# Patient Record
Sex: FEMALE | Race: WHITE | Employment: OTHER | URBAN - METROPOLITAN AREA
[De-identification: names, ages, dates, MRNs, and addresses within clinical notes are randomized per-mention and may not be internally consistent; named-entity substitution may affect disease eponyms.]

---

## 2017-03-07 ENCOUNTER — TELEPHONE (OUTPATIENT)
Dept: ONCOLOGY | Age: 82
End: 2017-03-07

## 2017-03-07 NOTE — TELEPHONE ENCOUNTER
Called pt and left a voicemail to remind her to have her labs drawn a few days prior to her appointment with Dr. Edwina Hawthorne on 3/14.

## 2017-03-08 ENCOUNTER — TELEPHONE (OUTPATIENT)
Dept: ONCOLOGY | Age: 82
End: 2017-03-08

## 2017-03-08 NOTE — TELEPHONE ENCOUNTER
Patient called stating she would like to cancel her upcoming appointment with Dr. Carmelita Trevizo and does not wish to reschedule.

## 2017-07-08 ENCOUNTER — APPOINTMENT (OUTPATIENT)
Dept: ULTRASOUND IMAGING | Age: 82
DRG: 641 | End: 2017-07-08
Attending: INTERNAL MEDICINE
Payer: MEDICARE

## 2017-07-08 ENCOUNTER — HOSPITAL ENCOUNTER (INPATIENT)
Age: 82
LOS: 1 days | Discharge: HOME | DRG: 641 | End: 2017-07-09
Attending: EMERGENCY MEDICINE | Admitting: INTERNAL MEDICINE
Payer: MEDICARE

## 2017-07-08 DIAGNOSIS — E87.5 HYPERKALEMIA: Primary | ICD-10-CM

## 2017-07-08 PROBLEM — E11.9 DM TYPE 2 (DIABETES MELLITUS, TYPE 2) (HCC): Chronic | Status: ACTIVE | Noted: 2017-07-08

## 2017-07-08 PROBLEM — N17.9 AKI (ACUTE KIDNEY INJURY) (HCC): Status: ACTIVE | Noted: 2017-07-08

## 2017-07-08 PROBLEM — M79.10 MYALGIA: Chronic | Status: ACTIVE | Noted: 2017-07-08

## 2017-07-08 PROBLEM — M79.604 RIGHT LEG PAIN: Status: ACTIVE | Noted: 2017-07-08

## 2017-07-08 PROBLEM — I25.10 CAD (CORONARY ARTERY DISEASE): Chronic | Status: ACTIVE | Noted: 2017-07-08

## 2017-07-08 LAB
ANION GAP BLD CALC-SCNC: 7 MMOL/L (ref 5–15)
ANION GAP BLD CALC-SCNC: 7 MMOL/L (ref 5–15)
APPEARANCE UR: CLEAR
BILIRUB UR QL: NEGATIVE
BUN SERPL-MCNC: 35 MG/DL (ref 6–20)
BUN SERPL-MCNC: 40 MG/DL (ref 6–20)
BUN/CREAT SERPL: 24 (ref 12–20)
BUN/CREAT SERPL: 24 (ref 12–20)
CALCIUM SERPL-MCNC: 8.5 MG/DL (ref 8.5–10.1)
CALCIUM SERPL-MCNC: 8.5 MG/DL (ref 8.5–10.1)
CHLORIDE SERPL-SCNC: 102 MMOL/L (ref 97–108)
CHLORIDE SERPL-SCNC: 106 MMOL/L (ref 97–108)
CO2 SERPL-SCNC: 26 MMOL/L (ref 21–32)
CO2 SERPL-SCNC: 26 MMOL/L (ref 21–32)
COLOR UR: NORMAL
CREAT SERPL-MCNC: 1.48 MG/DL (ref 0.55–1.02)
CREAT SERPL-MCNC: 1.64 MG/DL (ref 0.55–1.02)
CREAT UR-MCNC: 24.05 MG/DL
EOSINOPHIL #/AREA URNS HPF: NEGATIVE /[HPF]
GLUCOSE BLD STRIP.AUTO-MCNC: 117 MG/DL (ref 65–100)
GLUCOSE SERPL-MCNC: 88 MG/DL (ref 65–100)
GLUCOSE SERPL-MCNC: 99 MG/DL (ref 65–100)
GLUCOSE UR STRIP.AUTO-MCNC: NEGATIVE MG/DL
HGB UR QL STRIP: NEGATIVE
KETONES UR QL STRIP.AUTO: NEGATIVE MG/DL
LEUKOCYTE ESTERASE UR QL STRIP.AUTO: NEGATIVE
MAGNESIUM SERPL-MCNC: 2.3 MG/DL (ref 1.6–2.4)
NITRITE UR QL STRIP.AUTO: NEGATIVE
PH UR STRIP: 6 [PH] (ref 5–8)
PHOSPHATE SERPL-MCNC: 4.5 MG/DL (ref 2.6–4.7)
POTASSIUM SERPL-SCNC: 5.3 MMOL/L (ref 3.5–5.1)
POTASSIUM SERPL-SCNC: 6.4 MMOL/L (ref 3.5–5.1)
PROT UR STRIP-MCNC: NEGATIVE MG/DL
SERVICE CMNT-IMP: ABNORMAL
SODIUM SERPL-SCNC: 135 MMOL/L (ref 136–145)
SODIUM SERPL-SCNC: 139 MMOL/L (ref 136–145)
SODIUM UR-SCNC: 88 MMOL/L
SP GR UR REFRACTOMETRY: 1.01 (ref 1–1.03)
TROPONIN I SERPL-MCNC: <0.04 NG/ML
UROBILINOGEN UR QL STRIP.AUTO: 0.2 EU/DL (ref 0.2–1)

## 2017-07-08 PROCEDURE — 74011000250 HC RX REV CODE- 250: Performed by: FAMILY MEDICINE

## 2017-07-08 PROCEDURE — 36415 COLL VENOUS BLD VENIPUNCTURE: CPT | Performed by: FAMILY MEDICINE

## 2017-07-08 PROCEDURE — 99285 EMERGENCY DEPT VISIT HI MDM: CPT

## 2017-07-08 PROCEDURE — 81003 URINALYSIS AUTO W/O SCOPE: CPT | Performed by: INTERNAL MEDICINE

## 2017-07-08 PROCEDURE — 83735 ASSAY OF MAGNESIUM: CPT | Performed by: EMERGENCY MEDICINE

## 2017-07-08 PROCEDURE — 65660000000 HC RM CCU STEPDOWN

## 2017-07-08 PROCEDURE — 87205 SMEAR GRAM STAIN: CPT | Performed by: INTERNAL MEDICINE

## 2017-07-08 PROCEDURE — 74011636637 HC RX REV CODE- 636/637: Performed by: FAMILY MEDICINE

## 2017-07-08 PROCEDURE — 84484 ASSAY OF TROPONIN QUANT: CPT | Performed by: EMERGENCY MEDICINE

## 2017-07-08 PROCEDURE — 82962 GLUCOSE BLOOD TEST: CPT

## 2017-07-08 PROCEDURE — 74011250636 HC RX REV CODE- 250/636: Performed by: FAMILY MEDICINE

## 2017-07-08 PROCEDURE — 74011250636 HC RX REV CODE- 250/636: Performed by: INTERNAL MEDICINE

## 2017-07-08 PROCEDURE — 82570 ASSAY OF URINE CREATININE: CPT | Performed by: INTERNAL MEDICINE

## 2017-07-08 PROCEDURE — 93971 EXTREMITY STUDY: CPT

## 2017-07-08 PROCEDURE — 74011250636 HC RX REV CODE- 250/636: Performed by: EMERGENCY MEDICINE

## 2017-07-08 PROCEDURE — 74011250637 HC RX REV CODE- 250/637: Performed by: INTERNAL MEDICINE

## 2017-07-08 PROCEDURE — 76770 US EXAM ABDO BACK WALL COMP: CPT

## 2017-07-08 PROCEDURE — 84100 ASSAY OF PHOSPHORUS: CPT | Performed by: EMERGENCY MEDICINE

## 2017-07-08 PROCEDURE — 93005 ELECTROCARDIOGRAM TRACING: CPT

## 2017-07-08 PROCEDURE — 84300 ASSAY OF URINE SODIUM: CPT | Performed by: INTERNAL MEDICINE

## 2017-07-08 PROCEDURE — 80048 BASIC METABOLIC PNL TOTAL CA: CPT | Performed by: INTERNAL MEDICINE

## 2017-07-08 PROCEDURE — 96361 HYDRATE IV INFUSION ADD-ON: CPT

## 2017-07-08 PROCEDURE — 96374 THER/PROPH/DIAG INJ IV PUSH: CPT

## 2017-07-08 PROCEDURE — 80048 BASIC METABOLIC PNL TOTAL CA: CPT | Performed by: FAMILY MEDICINE

## 2017-07-08 PROCEDURE — 96375 TX/PRO/DX INJ NEW DRUG ADDON: CPT

## 2017-07-08 PROCEDURE — 74011000258 HC RX REV CODE- 258: Performed by: EMERGENCY MEDICINE

## 2017-07-08 RX ORDER — SODIUM POLYSTYRENE SULFONATE 15 G/60ML
30 SUSPENSION ORAL; RECTAL
Status: COMPLETED | OUTPATIENT
Start: 2017-07-08 | End: 2017-07-08

## 2017-07-08 RX ORDER — CALCIUM GLUCONATE 94 MG/ML
1 INJECTION, SOLUTION INTRAVENOUS
Status: DISCONTINUED | OUTPATIENT
Start: 2017-07-08 | End: 2017-07-08 | Stop reason: SDUPTHER

## 2017-07-08 RX ORDER — HEPARIN SODIUM 5000 [USP'U]/ML
5000 INJECTION, SOLUTION INTRAVENOUS; SUBCUTANEOUS EVERY 8 HOURS
Status: DISCONTINUED | OUTPATIENT
Start: 2017-07-08 | End: 2017-07-09 | Stop reason: HOSPADM

## 2017-07-08 RX ORDER — DIPHENHYDRAMINE HYDROCHLORIDE 50 MG/ML
12.5 INJECTION, SOLUTION INTRAMUSCULAR; INTRAVENOUS
Status: DISCONTINUED | OUTPATIENT
Start: 2017-07-08 | End: 2017-07-09 | Stop reason: HOSPADM

## 2017-07-08 RX ORDER — SERTRALINE HYDROCHLORIDE 50 MG/1
50 TABLET, FILM COATED ORAL EVERY EVENING
Status: DISCONTINUED | OUTPATIENT
Start: 2017-07-08 | End: 2017-07-09 | Stop reason: HOSPADM

## 2017-07-08 RX ORDER — MAGNESIUM SULFATE 100 %
4 CRYSTALS MISCELLANEOUS AS NEEDED
Status: DISCONTINUED | OUTPATIENT
Start: 2017-07-08 | End: 2017-07-09 | Stop reason: HOSPADM

## 2017-07-08 RX ORDER — HYDRALAZINE HYDROCHLORIDE 20 MG/ML
20 INJECTION INTRAMUSCULAR; INTRAVENOUS
Status: ACTIVE | OUTPATIENT
Start: 2017-07-08 | End: 2017-07-09

## 2017-07-08 RX ORDER — HYDROMORPHONE HYDROCHLORIDE 1 MG/ML
0.5 INJECTION, SOLUTION INTRAMUSCULAR; INTRAVENOUS; SUBCUTANEOUS
Status: DISCONTINUED | OUTPATIENT
Start: 2017-07-08 | End: 2017-07-09 | Stop reason: HOSPADM

## 2017-07-08 RX ORDER — CHOLECALCIFEROL (VITAMIN D3) 125 MCG
2000 CAPSULE ORAL DAILY
COMMUNITY

## 2017-07-08 RX ORDER — LEVOTHYROXINE SODIUM 88 UG/1
88 TABLET ORAL
Status: DISCONTINUED | OUTPATIENT
Start: 2017-07-09 | End: 2017-07-09 | Stop reason: HOSPADM

## 2017-07-08 RX ORDER — HYDROCODONE BITARTRATE AND ACETAMINOPHEN 5; 325 MG/1; MG/1
1 TABLET ORAL
Status: DISCONTINUED | OUTPATIENT
Start: 2017-07-08 | End: 2017-07-09 | Stop reason: HOSPADM

## 2017-07-08 RX ORDER — ACETAMINOPHEN 325 MG/1
650 TABLET ORAL
Status: DISCONTINUED | OUTPATIENT
Start: 2017-07-08 | End: 2017-07-09 | Stop reason: HOSPADM

## 2017-07-08 RX ORDER — ONDANSETRON 2 MG/ML
4 INJECTION INTRAMUSCULAR; INTRAVENOUS
Status: DISCONTINUED | OUTPATIENT
Start: 2017-07-08 | End: 2017-07-09 | Stop reason: HOSPADM

## 2017-07-08 RX ORDER — HYDRALAZINE HYDROCHLORIDE 20 MG/ML
20 INJECTION INTRAMUSCULAR; INTRAVENOUS
Status: DISCONTINUED | OUTPATIENT
Start: 2017-07-08 | End: 2017-07-09 | Stop reason: HOSPADM

## 2017-07-08 RX ORDER — CHOLECALCIFEROL (VITAMIN D3) 25 MCG
2500 TABLET,CHEWABLE ORAL
COMMUNITY

## 2017-07-08 RX ORDER — FLUOCINONIDE 0.5 MG/G
OINTMENT TOPICAL
COMMUNITY

## 2017-07-08 RX ORDER — ASPIRIN 81 MG/1
81 TABLET ORAL EVERY EVENING
Status: DISCONTINUED | OUTPATIENT
Start: 2017-07-08 | End: 2017-07-09 | Stop reason: HOSPADM

## 2017-07-08 RX ORDER — SODIUM CHLORIDE 9 MG/ML
75 INJECTION, SOLUTION INTRAVENOUS CONTINUOUS
Status: DISCONTINUED | OUTPATIENT
Start: 2017-07-08 | End: 2017-07-09 | Stop reason: HOSPADM

## 2017-07-08 RX ORDER — SODIUM CHLORIDE 0.9 % (FLUSH) 0.9 %
5-10 SYRINGE (ML) INJECTION EVERY 8 HOURS
Status: DISCONTINUED | OUTPATIENT
Start: 2017-07-08 | End: 2017-07-09 | Stop reason: HOSPADM

## 2017-07-08 RX ORDER — INSULIN LISPRO 100 [IU]/ML
INJECTION, SOLUTION INTRAVENOUS; SUBCUTANEOUS
Status: DISCONTINUED | OUTPATIENT
Start: 2017-07-08 | End: 2017-07-09 | Stop reason: HOSPADM

## 2017-07-08 RX ORDER — PRAVASTATIN SODIUM 20 MG/1
20 TABLET ORAL
COMMUNITY
End: 2017-07-08 | Stop reason: CLARIF

## 2017-07-08 RX ORDER — CEPHALEXIN 500 MG/1
1000 CAPSULE ORAL 2 TIMES DAILY
Status: ON HOLD | COMMUNITY
Start: 2017-07-03 | End: 2017-07-09

## 2017-07-08 RX ORDER — DOCUSATE SODIUM 100 MG/1
100 CAPSULE, LIQUID FILLED ORAL 2 TIMES DAILY
Status: DISCONTINUED | OUTPATIENT
Start: 2017-07-08 | End: 2017-07-09 | Stop reason: HOSPADM

## 2017-07-08 RX ORDER — DEXTROSE 50 % IN WATER (D50W) INTRAVENOUS SYRINGE
12.5-25 AS NEEDED
Status: DISCONTINUED | OUTPATIENT
Start: 2017-07-08 | End: 2017-07-09 | Stop reason: HOSPADM

## 2017-07-08 RX ORDER — FUROSEMIDE 10 MG/ML
20 INJECTION INTRAMUSCULAR; INTRAVENOUS
Status: DISCONTINUED | OUTPATIENT
Start: 2017-07-08 | End: 2017-07-08

## 2017-07-08 RX ORDER — SODIUM CHLORIDE 0.9 % (FLUSH) 0.9 %
5-10 SYRINGE (ML) INJECTION AS NEEDED
Status: DISCONTINUED | OUTPATIENT
Start: 2017-07-08 | End: 2017-07-09 | Stop reason: HOSPADM

## 2017-07-08 RX ORDER — METOPROLOL TARTRATE 25 MG/1
12.5 TABLET, FILM COATED ORAL 2 TIMES DAILY
Status: DISCONTINUED | OUTPATIENT
Start: 2017-07-08 | End: 2017-07-09 | Stop reason: HOSPADM

## 2017-07-08 RX ORDER — DEXTROSE 50 % IN WATER (D50W) INTRAVENOUS SYRINGE
50
Status: COMPLETED | OUTPATIENT
Start: 2017-07-08 | End: 2017-07-08

## 2017-07-08 RX ORDER — NALOXONE HYDROCHLORIDE 0.4 MG/ML
0.4 INJECTION, SOLUTION INTRAMUSCULAR; INTRAVENOUS; SUBCUTANEOUS AS NEEDED
Status: DISCONTINUED | OUTPATIENT
Start: 2017-07-08 | End: 2017-07-09 | Stop reason: HOSPADM

## 2017-07-08 RX ADMIN — METOPROLOL TARTRATE 12.5 MG: 25 TABLET ORAL at 20:47

## 2017-07-08 RX ADMIN — Medication 10 ML: at 22:46

## 2017-07-08 RX ADMIN — SERTRALINE 50 MG: 50 TABLET, FILM COATED ORAL at 20:47

## 2017-07-08 RX ADMIN — SODIUM CHLORIDE 75 ML/HR: 900 INJECTION, SOLUTION INTRAVENOUS at 19:29

## 2017-07-08 RX ADMIN — SODIUM POLYSTYRENE SULFONATE 30 G: 15 SUSPENSION ORAL; RECTAL at 16:32

## 2017-07-08 RX ADMIN — CALCIUM GLUCONATE 1 G: 94 INJECTION, SOLUTION INTRAVENOUS at 16:05

## 2017-07-08 RX ADMIN — SODIUM CHLORIDE 1000 ML: 900 INJECTION, SOLUTION INTRAVENOUS at 15:22

## 2017-07-08 RX ADMIN — ASPIRIN 81 MG: 81 TABLET, COATED ORAL at 20:47

## 2017-07-08 RX ADMIN — HEPARIN SODIUM 5000 UNITS: 5000 INJECTION, SOLUTION INTRAVENOUS; SUBCUTANEOUS at 22:44

## 2017-07-08 RX ADMIN — DEXTROSE MONOHYDRATE 25 G: 25 INJECTION, SOLUTION INTRAVENOUS at 15:59

## 2017-07-08 RX ADMIN — HYDRALAZINE HYDROCHLORIDE 20 MG: 20 INJECTION INTRAMUSCULAR; INTRAVENOUS at 20:34

## 2017-07-08 RX ADMIN — HUMAN INSULIN 10 UNITS: 100 INJECTION, SOLUTION SUBCUTANEOUS at 16:03

## 2017-07-08 NOTE — PROGRESS NOTES
[facility]: MED RECONCILIATION    Comments/Recommendations:    Cephalexin x 10 days - started 7/3/2017 PM for leg wound on right lower extremity sustained while getting off a boat 7/3/17.  Recommend dose adjustment to Cephalexin 500 mg PO every 12 hours for CrCl 23.8 mL/min.  Diclofenac DR 75 mg PO BID - started 7/3/2017 PM for leg wound on right lower extremity sustained while getting off a boat 7/3/17. Patient states she stopped taking it after a few days. Last dose was 7/6/2017.  Recommend continue to hold all NSAIDs in light of KATHRINE.  Recommend hold Losartan and Metformin in light of KATHRINE. Information obtained from: patient    Allergies: Chlorhexidine; Fluoxetine; Lipitor [atorvastatin]; and Lisinopril    Prior to Admission Medications   Prescriptions Last Dose Informant Patient Reported? Taking? VIT A/VIT C/VIT E/ZINC/COPPER (PRESERVISION AREDS PO) 7/8/2017 at AM Self Yes Yes   Sig: Take 1 Tab by mouth two (2) times a day. aspirin 81 mg tablet 7/7/2017 at PM Self Yes Yes   Sig: Take 81 mg by mouth every evening. cephALEXin (KEFLEX) 500 mg capsule 7/8/2017 at AM Self Yes Yes   Sig: Take 1,000 mg by mouth two (2) times a day. cholecalciferol, vitamin D3, 2,000 unit tab 7/8/2017 at Unknown time Self Yes Yes   Sig: Take 2,000 Units by mouth daily. cyanocobalamin, vitamin B-12, 2,500 mcg chew 7/2/2017 Self Yes Yes   Sig: Take 2,500 mcg by mouth every Sunday. fluocinoNIDE (LIDEX) 0.05 % ointment 7/7/2017 at Unknown time Self Yes Yes   Sig: Apply  to affected area nightly as needed (itching due to eczema). levothyroxine (SYNTHROID) 88 mcg tablet 7/8/2017 at Unknown time Self Yes Yes   Sig: Take 88 mcg by mouth Daily (before breakfast). losartan (COZAAR) 25 mg tablet 7/8/2017 at Unknown time Self Yes Yes   Sig: Take 25 mg by mouth daily. metFORMIN (GLUCOPHAGE) 500 mg tablet 7/7/2017 at Unknown time Self Yes Yes   Sig: Take 500 mg by mouth daily (with dinner).    metoprolol (LOPRESSOR) 25 mg tablet 7/8/2017 at AM Self Yes Yes   Sig: Take 12.5 mg by mouth two (2) times a day. sertraline (ZOLOFT) 50 mg tablet 7/7/2017 at PM Self Yes Yes   Sig: Take 50 mg by mouth every evening.         Margarette Cartwright, Pharmacist

## 2017-07-08 NOTE — ED PROVIDER NOTES
Patient is a 80 y.o. female presenting with abnormal lab results. The history is provided by the patient. Abnormal Lab Results   This is a new problem. The current episode started 3 to 5 hours ago (K 6.0 and Cr 1.6 at [facility]). Pertinent negatives include no chest pain. She has tried nothing for the symptoms. Patient had presented to [facility] for an injury to her right lower extremity sustained while getting off a boat 7/3/17. There was a linear shallow laceration that was glued and steri-stripped. While at the urgent care, she had labs drawn which revealed the abnormality. Past Medical History:   Diagnosis Date    CAD (coronary artery disease)     Coagulation defects     anemia    Diabetes ([address]     Thyroid disease        Past Surgical History:   Procedure Laterality Date    CARDIAC SURG PROCEDURE UNLIST      CABBAGE 2012    HX COLONOSCOPY  2012        HX GYN      hysterectomy    HX HEENT      right eye macular repair    HX HYSTERECTOMY  2004    Dr.Charles Osorio [place]    HX OTHER SURGICAL      macular eye hole repair      HX OTHER SURGICAL  12    open heart surgery     HX OTHER SURGICAL      artery plaque right leg 14-- left leg 1/12/15          History reviewed. No pertinent family history. Social History     Social History    Marital status:      Spouse name: N/A    Number of children: N/A    Years of education: N/A     Occupational History    Not on file. Social History Main Topics    Smoking status: Former Smoker    Smokeless tobacco: Never Used    Alcohol use Not on file      Comment: occassional wine    Drug use: No    Sexual activity: No     Other Topics Concern    Not on file     Social History Narrative         ALLERGIES: Chlorhexidine; Fluoxetine; Lipitor [atorvastatin]; and Lisinopril    Review of Systems   Constitutional: Negative for chills and fever.    Respiratory: Negative for cough and wheezing. Cardiovascular: Negative for chest pain and palpitations. Gastrointestinal: Negative for abdominal distention and nausea. Vitals:    07/08/17 1411 07/08/17 1433 07/08/17 1530 07/08/17 1540   BP:    177/66   Pulse:   (!) 57    Resp:   15    Temp:       SpO2: 98% 98% 100%    Weight:       Height:                Physical Exam   Constitutional: She appears well-developed and well-nourished. No distress. HENT:   Head: Normocephalic and atraumatic. Right Ear: External ear normal.   Left Ear: External ear normal.   Mouth/Throat: No oropharyngeal exudate. Eyes: Pupils are equal, round, and reactive to light. Right eye exhibits no discharge. Left eye exhibits no discharge. No scleral icterus. Neck: Normal range of motion. No thyromegaly present. Cardiovascular: Normal rate and regular rhythm. Exam reveals no gallop and no friction rub. No murmur heard. Pulmonary/Chest: Effort normal. No respiratory distress. She has no wheezes. She has no rales. Abdominal: Soft. Bowel sounds are normal. She exhibits no distension. There is no tenderness. Musculoskeletal: She exhibits no edema. Linear laceration vertically on tibia 6cm in length, reasonably well approximated with some coagulated blood present. There is blue/erd/brown bruising surrounding the laceration in the anterior lower extremity below the knee, with slight swelling to leg compared to left, no pitting edema   Lymphadenopathy:     She has no cervical adenopathy. Neurological: She is alert. No cranial nerve deficit. Skin: Skin is warm and dry. She is not diaphoretic. Psychiatric: She has a normal mood and affect. Her behavior is normal.   Nursing note and vitals reviewed. MDM  Number of Diagnoses or Management Options  Diagnosis management comments: Elevated creatinine, likely KATHRINE given associated hyperkalemia. Swelling of leg is minor and probably secondary to injury, pain is focal to anterior tibia, likey not VTE. Sats and vitals do not indicate pulmonary VTE. Treating hyperkalemia w/ calcium gluconate and insulin. Treating KATHRINE with saline. Her pressure is elevated and she is given IV hydralazine in the ED. ED Course       Procedures      Dr. Shira Quezada will evaluate for admission.

## 2017-07-08 NOTE — ROUTINE PROCESS
TRANSFER - OUT REPORT:    Verbal report given to Evelyn RN(name) on Hong Ware  being transferred to 3rd floor(unit) for routine progression of care       Report consisted of patients Situation, Background, Assessment and   Recommendations(SBAR). Information from the following report(s) SBAR, Kardex, ED Summary and MAR was reviewed with the receiving nurse. Lines:   Peripheral IV 07/08/17 Right Antecubital (Active)   Site Assessment Clean, dry, & intact 7/8/2017  2:16 PM   Phlebitis Assessment 0 7/8/2017  2:16 PM   Infiltration Assessment 0 7/8/2017  2:16 PM   Dressing Status Clean, dry, & intact 7/8/2017  2:16 PM   Dressing Type Tape;Transparent 7/8/2017  2:16 PM   Hub Color/Line Status Pink;Flushed;Patent 7/8/2017  2:16 PM   Action Taken Blood drawn 7/8/2017  2:16 PM   Alcohol Cap Used Yes 7/8/2017  2:16 PM        Opportunity for questions and clarification was provided.       Patient transported with:   Monitor  Tech

## 2017-07-08 NOTE — IP AVS SNAPSHOT
[facility]ersgracielal Bail 
 
 
 [address] [place] [address] 
[phone] Patient: Khalida Lester MRN: [id number] :1934 You are allergic to the following Allergen Reactions Chlorhexidine Other (comments) Burning rash that excoriated layer of skin Fluoxetine Nausea Only Other (comments) Radha Abby Lipitor (Atorvastatin) Myalgia Lisinopril Cough  
 depression Recent Documentation Height Weight BMI OB Status Smoking Status 1.549 m 67.6 kg 28.15 kg/m2 Hysterectomy Former Smoker Emergency Contacts Name Discharge Info Relation Home Work Mobile [id number] [facility] CAREGIVER [3] Child [2] [phone] About your hospitalization You were admitted on:  2017 You [facility] You were discharged on:  2017 Unit phone number:  [phone] Why you were hospitalized Your primary diagnosis was:  Not on File Your diagnoses also included:  Hyperkalemia, [place] (Acute Kidney Injury) ([facility]), Right Leg Pain, Cad (Coronary Artery Disease), Dm Type 2 (Diabetes Mellitus, Type 2) ([facility]), Myalgia, Anemia Providers Seen During Your Hospitalizations Provider Role Specialty Primary office phone Rene Bond MD Attending Provider Emergency Medicine [phone] Kuldeep Valles MD Attending Provider Internal Medicine [phone] Your Primary Care Physician (PCP) Primary Care Physician Office Phone Office Fax Sudhir Boateng [phone] Follow-up Information Follow up With Details Comments Contact Info Barber Mark MD   [address] [place] [address] 
[phone] Current Discharge Medication List  
  
CONTINUE these medications which have CHANGED Dose & Instructions Dispensing Information Comments Morning Noon Evening Bedtime  
 cephALEXin 500 mg capsule Commonly known as:  [place] What changed:  how much to take Your last dose was: Your next dose is:    
   
   
 Dose:  500 mg Take 1 Cap by mouth two (2) times a day for 10 days. Quantity:  20 Cap Refills:  0 CONTINUE these medications which have NOT CHANGED Dose & Instructions Dispensing Information Comments Morning Noon Evening Bedtime  
 aspirin 81 mg tablet Your last dose was: Your next dose is:    
   
   
 Dose:  81 mg Take 81 mg by mouth every evening. Refills:  0  
     
   
   
   
  
 cholecalciferol (vitamin D3) 2,000 unit Tab Your last dose was: Your next dose is:    
   
   
 Dose:  2000 Units Take 2,000 Units by mouth daily. Refills:  0  
     
   
   
   
  
 cyanocobalamin (vitamin B-12) 2,500 mcg Chew Your last dose was: Your next dose is:    
   
   
 Dose:  2500 mcg Take 2,500 mcg by mouth every Sunday. Refills:  0  
     
   
   
   
  
 fluocinoNIDE 0.05 % ointment Commonly known as:  LIDEX Your last dose was: Your next dose is:    
   
   
 Apply  to affected area nightly as needed (itching due to eczema). Refills:  0  
     
   
   
   
  
 levothyroxine 88 mcg tablet Commonly known as:  SYNTHROID Your last dose was: Your next dose is:    
   
   
 Dose:  88 mcg Take 88 mcg by mouth Daily (before breakfast). Refills:  0  
     
   
   
   
  
 metoprolol tartrate 25 mg tablet Commonly known as:  LOPRESSOR Your last dose was: Your next dose is:    
   
   
 Dose:  12.5 mg Take 12.5 mg by mouth two (2) times a day. Refills:  0 PRESERVISION AREDS PO Your last dose was: Your next dose is:    
   
   
 Dose:  1 Tab Take 1 Tab by mouth two (2) times a day. Refills:  0  
     
   
   
   
  
 sertraline 50 mg tablet Commonly known as:  ZOLOFT Your last dose was: Your next dose is:    
   
   
 Dose:  50 mg Take 50 mg by mouth every evening. Refills:  0 STOP taking these medications   
 losartan 25 mg tablet Commonly known as:  COZAAR  
   
  
 metFORMIN 500 mg tablet Commonly known as:  GLUCOPHAGE Where to Get Your Medications Information on where to get these meds will be given to you by the nurse or doctor. ! Ask your nurse or doctor about these medications  
  cephALEXin 500 mg capsule Discharge Instructions HOSPITALIST DISCHARGE INSTRUCTIONS 
NAME: Alaina Barajas :  1934 MRN:  [id number] Date/Time:  2017 10:38 AM 
 
ADMIT DATE: 2017 DISCHARGE DATE: 2017 ADMITTING DIAGNOSIS: 
Hyperkalemia (K+ 6.4 on admission) Acute renal failure (Creatinine 1.64 on admission) DISCHARGE DIAGNOSIS: 
As above MEDICATIONS: 
We will have your hold your home losartan (due to your elevated potassium and kidney numbers) and metformin (elevated creatinine) Please also take 1/2 of your prescribed Keflex dose. For your age and kidney numbers, 500mg BID is appropriate (written as 1000mg BID) Also please note that while your kidneys are recovering would advise not using NSAIDS like ibuprofen, diclofenac, naproxen, etc 
  
· It is important that you take the medication exactly as they are prescribed. · Keep your medication in the bottles provided by the pharmacist and keep a list of the medication names, dosages, and times to be taken in your wallet. · Do not take other medications without consulting your doctor. Pain Management: per above medications What to do at [facility] Recommended diet:  Diabetic Diet Recommended activity: Activity as tolerated If you experience any of the following symptoms then please call your primary care physician or return to the emergency room if you cannot get hold of your doctor: 
Fever, chills, nausea, vomiting, diarrhea, change in mentation, falling, bleeding, shortness of breath, chest pain Follow Up: 
Dr. Ayanna Moses MD  you are to call and set up an appointment as soon as possible for basic metabolic panel check Information obtained by : 
I understand that if any problems occur once I am at home I am to contact my physician. I understand and acknowledge receipt of the instructions indicated above. Physician's or R.N.'s Signature                                                                  Date/Time Patient or Representative Signature                                                          Date/Time Discharge Orders None MessageGearsharMeBeam Announcement We are excited to announce that we are making your provider's discharge notes available to you in [employer]. You will see these notes when they are completed and signed by the physician that discharged you from your recent hospital stay. If you have any questions or concerns about any information you see in [employer], please call the Health Information Department where you were seen or reach out to your Primary Care Provider for more information about your plan of care. Introducing [facility]! Dear Jovita Olson: 
Thank you for requesting a Logicworks account. Our records indicate that you already have an active Logicworks account. You can access your account anytime at [email or web address] Did you know that you can access your hospital and ER discharge instructions at any time in Logicworks? You can also review all of your test results from your hospital stay or ER visit. Additional Information If you have questions, please visit the Frequently Asked Questions section of the [email or web address] website at [email or web address] Remember, MyChart is NOT to be used for urgent needs. For medical emergencies, dial 911. Now available from your iPhone and Android! General Information Please provide this summary of care documentation to your next provider. Patient Signature:  ____________________________________________________________ Date:  ____________________________________________________________  
  
Gearldine Moulds Provider Signature:  ____________________________________________________________ Date:  ____________________________________________________________

## 2017-07-08 NOTE — ED TRIAGE NOTES
Monday [place], had a laceration which was glued at [employer]. Went back there for a wound check today and had labs drawn due to concern of infection. Incidentally found out that her potassium was 6.0 with labs.

## 2017-07-08 NOTE — PROGRESS NOTES
Dictated  Here with merly loja and high K  Creat was a little merly 18mo ago - she denies known hx of same    +dm  +htn  + tobacco  + arb tx  + diarrhea and decr po intake  + recent wound and antibiotic therapy    No hydro on US    Would:  - cont ivf  - recheck K/creat 1900 + q am  - cont off arb  - we will be back Monday - I will check labs tomorrow if better, could see pcp Monday for f/u labs and see us as needed     Thanks!

## 2017-07-08 NOTE — H&P
Gus Ferreira [facility] [address]  [address] [facility], [address] [place] [address]  [phone]    Admission History and Physical      NAME:  Marlon High   :   1934   MRN:  [id number]     PCP:  Kemal Branch MD     Date/Time:  2017         Subjective:     CHIEF COMPLAINT: hyperkalemia     HISTORY OF PRESENT ILLNESS:     The patient is a 81 yo hx of DM, CAD, anemia, presented w/ KATHRINE, hyperkalemia. The patient stated that she injured her right leg several days ago after falling from a boat. She subsequently went to [facility] and received keflex and Diclofenac. The patient went back to [facility] today for a f/u and was found to have a potassium of 6.0. She was sent to [address] [place] [address] ED. The patient denied chest pain, SOB, fevers, chills, nausea, vomiting, diarrhea. In the ED, K was 6.4 w/o EKG changes. Cr 1.64. Allergies   Allergen Reactions    Chlorhexidine Other (comments)     Burning rash that excoriated layer of skin    Fluoxetine Nausea Only and Other (comments)     Insomina    Lipitor [Atorvastatin] Myalgia    Lisinopril Cough     depression       Prior to Admission medications    Medication Sig Start Date End Date Taking? Authorizing Provider   fluocinoNIDE (LIDEX) 0.05 % ointment Apply  to affected area nightly as needed (itching due to eczema). Yes Historical Provider   cephALEXin (KEFLEX) 500 mg capsule Take 1,000 mg by mouth two (2) times a day. 7/3/17 7/13/17 Yes Historical Provider   cholecalciferol, vitamin D3, 2,000 unit tab Take 2,000 Units by mouth daily. Yes Historical Provider   cyanocobalamin, vitamin B-12, 2,500 mcg chew Take 2,500 mcg by mouth every . Yes Historical Provider   levothyroxine (SYNTHROID) 88 mcg tablet Take 88 mcg by mouth Daily (before breakfast).  16  Yes Historical Provider   sertraline (ZOLOFT) 50 mg tablet Take 50 mg by mouth every evening. 16  Yes Historical Provider   VIT A/VIT C/VIT E/ZINC/COPPER (PRESERVISION AREDS PO) Take 1 Tab by mouth two (2) times a day. Yes Historical Provider   metFORMIN (GLUCOPHAGE) 500 mg tablet Take 500 mg by mouth daily (with dinner). Yes Historical Provider   losartan (COZAAR) 25 mg tablet Take 25 mg by mouth daily. Yes Historical Provider   metoprolol (LOPRESSOR) 25 mg tablet Take 12.5 mg by mouth two (2) times a day. Yes Historical Provider   aspirin 81 mg tablet Take 81 mg by mouth every evening. Yes Historical Provider       Past Medical History:   Diagnosis Date    CAD (coronary artery disease)     Coagulation defects     anemia    Diabetes ([address]     Thyroid disease         Past Surgical History:   Procedure Laterality Date    CARDIAC SURG PROCEDURE UNLIST      CABBAGE 5/2012    HX COLONOSCOPY  8/7/2012        HX GYN      hysterectomy    HX HEENT      right eye macular repair    HX HYSTERECTOMY  1/1/2004    Dr.Charles Skyler [place]    HX OTHER SURGICAL      macular eye hole repair 1998     HX OTHER SURGICAL  5/22/12    open heart surgery     HX OTHER SURGICAL      artery plaque right leg 4/30/14-- left leg 1/12/15        Social History   Substance Use Topics    Smoking status: Former Smoker    Smokeless tobacco: Never Used    Alcohol use No      Comment: occassional wine        History reviewed. No pertinent family history.      Review of Systems:  (bold if positive, if negative)    Gen:  Eyes:  ENT:  CVS:  Pulm:  GI:    :    MS:  Skin:   wound,Psych:  Endo:    Hem:  Renal:    Neuro:          Objective:      VITALS:    Vital signs reviewed; most recent are:    Visit Vitals    /52    Pulse 62    Temp 98.2 °F (36.8 °C)    Resp 16    Ht 5' 5\" (1.651 m)    Wt 63.5 kg (140 lb)    SpO2 99%    BMI 23.3 kg/m2     SpO2 Readings from Last 6 Encounters:   07/08/17 99%   09/14/16 96%   04/28/16 100%   12/04/15 97%   10/30/15 98%   06/25/15 98%        No intake or output data in the 24 hours ending 07/08/17 1622     Exam:     Physical Exam:    Gen:  Well-developed, well-nourished, in no acute distress  HEENT:  Pink conjunctivae, PERRL, hearing intact to voice, moist mucous membranes  Neck:  Supple, without masses, thyroid non-tender  Resp:  No accessory muscle use, clear breath sounds without wheezes rales or rhonchi  Card:  No murmurs, normal S1, S2 without thrills, bruits or peripheral edema  Abd:  Soft, non-tender, non-distended, normoactive bowel sounds are present  Lymph:  No cervical adenopathy  Musc:  No cyanosis or clubbing  Skin:  Right leg swelling, ecchymosis. Small shin wound. No purulent drainage  Neuro:  Cranial nerves 3-12 are grossly intact, follows commands appropriately  Psych:  Alert with good insight. Oriented to person, place, and time    Labs:    No results for input(s): WBC, HGB, HCT, PLT, HGBEXT, HCTEXT, PLTEXT in the last 72 hours. Recent Labs      07/08/17   1419   NA  135*   K  6.4*   CL  102   CO2  26   GLU  99   BUN  40*   CREA  1.64*   CA  8.5   MG  2.3   PHOS  4.5     No results found for: GLUCPOC  No results for input(s): PH, PCO2, PO2, HCO3, FIO2 in the last 72 hours. No results for input(s): INR in the last 72 hours. No lab exists for component: INREXT    Radiology and EKG reviewed:   EKG w/o peaked Ts    **Old Records reviewed in [facility]**       Assessment/Plan:       Active Problems:    79 yo hx of DM, CAD, anemia, presented w/ KATHRINE, hyperkalemia    1) Acute hyperkalemia: likely due to recent NSAIDS and KATHRINE. Will hold NSAIDS, ARB. Patient received IV calcium, insulin/glucose in the ED. Will give one dose of kayexalate. Monitor BMP every 6h. Consult Renal    2) KATHRINE: likely due to NSAIDS. Will check urine lytes, eos, renal U/S. Hold NSAIDS, ARB, metformin. Cont IVF, monitor BMP    3) R leg injury/wound/swelling: due to fall from boat. Not infected. Will check LE doppler to r/o DVT. Hold Keflex. Consult wound care    4) CAD: cont ASA, BB. Hold statin due to myalgia    5) HTN: cont BB. Hold ARB. Start IV hydralazine prn    6) DM type 2: check A1C. Hold metformin. Start SSI    7) Anemia: chronic, unclear etiology. Was followed by Dr. Marietta Yang.   Will monitor CBC    8) Myalgia: will hold statin    Code: Full    Risk of deterioration: high      Total time spent with patient: 79 895 [address] discussed with: Patient, family, nursing, pharm    Discussed:  Care Plan    Prophylaxis:  Hep SQ    Probable Disposition:  Home w/Family           ___________________________________________________    Attending Physician: Joseph Lowry MD

## 2017-07-08 NOTE — ED NOTES
Assumed care of pt, lying on stretcher with dressing to lower right leg. Dressing removed and wound assessed.

## 2017-07-08 NOTE — IP AVS SNAPSHOT
Current Discharge Medication List  
  
CONTINUE these medications which have CHANGED Dose & Instructions Dispensing Information Comments Morning Noon Evening Bedtime  
 cephALEXin 500 mg capsule Commonly known as:  Chanel Pakr What changed:  how much to take Your last dose was: Your next dose is:    
   
   
 Dose:  500 mg Take 1 Cap by mouth two (2) times a day for 10 days. Quantity:  20 Cap Refills:  0 CONTINUE these medications which have NOT CHANGED Dose & Instructions Dispensing Information Comments Morning Noon Evening Bedtime  
 aspirin 81 mg tablet Your last dose was: Your next dose is:    
   
   
 Dose:  81 mg Take 81 mg by mouth every evening. Refills:  0  
     
   
   
   
  
 cholecalciferol (vitamin D3) 2,000 unit Tab Your last dose was: Your next dose is:    
   
   
 Dose:  2000 Units Take 2,000 Units by mouth daily. Refills:  0  
     
   
   
   
  
 cyanocobalamin (vitamin B-12) 2,500 mcg Chew Your last dose was: Your next dose is:    
   
   
 Dose:  2500 mcg Take 2,500 mcg by mouth every Sunday. Refills:  0  
     
   
   
   
  
 fluocinoNIDE 0.05 % ointment Commonly known as:  LIDEX Your last dose was: Your next dose is:    
   
   
 Apply  to affected area nightly as needed (itching due to eczema). Refills:  0  
     
   
   
   
  
 levothyroxine 88 mcg tablet Commonly known as:  SYNTHROID Your last dose was: Your next dose is:    
   
   
 Dose:  88 mcg Take 88 mcg by mouth Daily (before breakfast). Refills:  0  
     
   
   
   
  
 metoprolol tartrate 25 mg tablet Commonly known as:  LOPRESSOR Your last dose was: Your next dose is:    
   
   
 Dose:  12.5 mg Take 12.5 mg by mouth two (2) times a day. Refills:  0 PRESERVISION AREDS PO Your last dose was: Your next dose is:    
   
   
 Dose:  1 Tab Take 1 Tab by mouth two (2) times a day. Refills:  0  
     
   
   
   
  
 sertraline 50 mg tablet Commonly known as:  ZOLOFT Your last dose was: Your next dose is:    
   
   
 Dose:  50 mg Take 50 mg by mouth every evening. Refills:  0 STOP taking these medications   
 losartan 25 mg tablet Commonly known as:  COZAAR  
   
  
 metFORMIN 500 mg tablet Commonly known as:  GLUCOPHAGE Where to Get Your Medications Information on where to get these meds will be given to you by the nurse or doctor. ! Ask your nurse or doctor about these medications  
  cephALEXin 500 mg capsule

## 2017-07-08 NOTE — PROGRESS NOTES
1830TRANSFER - IN REPORT:    Verbal report received from ED(name) on Alaina Barajas  being received from ED(unit) for routine progression of care      Report consisted of patients Situation, Background, Assessment and   Recommendations(SBAR). Information from the following report(s) SBAR, Kardex, Intake/Output and Recent Results was reviewed with the receiving nurse. Opportunity for questions and clarification was provided. REport given to SARA Buenrostro to complete admission.

## 2017-07-08 NOTE — ED NOTES
Verbal shift change report given to [facility] (oncoming nurse) by Deana Bermudez (offgoing nurse). Report included the following information SBAR, Kardex, ED Summary, Procedure Summary, MAR and Recent Results.

## 2017-07-09 VITALS
HEIGHT: 61 IN | RESPIRATION RATE: 16 BRPM | DIASTOLIC BLOOD PRESSURE: 43 MMHG | BODY MASS INDEX: 28.13 KG/M2 | WEIGHT: 149 LBS | TEMPERATURE: 98.4 F | HEART RATE: 56 BPM | SYSTOLIC BLOOD PRESSURE: 144 MMHG | OXYGEN SATURATION: 98 %

## 2017-07-09 LAB
ALBUMIN SERPL BCP-MCNC: 3.3 G/DL (ref 3.5–5)
ALBUMIN/GLOB SERPL: 1.1 {RATIO} (ref 1.1–2.2)
ALP SERPL-CCNC: 64 U/L (ref 45–117)
ALT SERPL-CCNC: 15 U/L (ref 12–78)
ANION GAP BLD CALC-SCNC: 10 MMOL/L (ref 5–15)
ANION GAP BLD CALC-SCNC: 11 MMOL/L (ref 5–15)
ANION GAP BLD CALC-SCNC: 9 MMOL/L (ref 5–15)
AST SERPL W P-5'-P-CCNC: 15 U/L (ref 15–37)
BILIRUB DIRECT SERPL-MCNC: 0.1 MG/DL (ref 0–0.2)
BILIRUB SERPL-MCNC: 0.4 MG/DL (ref 0.2–1)
BUN SERPL-MCNC: 27 MG/DL (ref 6–20)
BUN SERPL-MCNC: 29 MG/DL (ref 6–20)
BUN SERPL-MCNC: 32 MG/DL (ref 6–20)
BUN/CREAT SERPL: 21 (ref 12–20)
BUN/CREAT SERPL: 21 (ref 12–20)
BUN/CREAT SERPL: 23 (ref 12–20)
CALCIUM SERPL-MCNC: 8 MG/DL (ref 8.5–10.1)
CALCIUM SERPL-MCNC: 8.3 MG/DL (ref 8.5–10.1)
CALCIUM SERPL-MCNC: 8.6 MG/DL (ref 8.5–10.1)
CHLORIDE SERPL-SCNC: 104 MMOL/L (ref 97–108)
CHLORIDE SERPL-SCNC: 108 MMOL/L (ref 97–108)
CHLORIDE SERPL-SCNC: 108 MMOL/L (ref 97–108)
CO2 SERPL-SCNC: 19 MMOL/L (ref 21–32)
CO2 SERPL-SCNC: 21 MMOL/L (ref 21–32)
CO2 SERPL-SCNC: 22 MMOL/L (ref 21–32)
CREAT SERPL-MCNC: 1.3 MG/DL (ref 0.55–1.02)
CREAT SERPL-MCNC: 1.37 MG/DL (ref 0.55–1.02)
CREAT SERPL-MCNC: 1.38 MG/DL (ref 0.55–1.02)
ERYTHROCYTE [DISTWIDTH] IN BLOOD BY AUTOMATED COUNT: 14.2 % (ref 11.5–14.5)
EST. AVERAGE GLUCOSE BLD GHB EST-MCNC: 103 MG/DL
GLOBULIN SER CALC-MCNC: 2.9 G/DL (ref 2–4)
GLUCOSE BLD STRIP.AUTO-MCNC: 102 MG/DL (ref 65–100)
GLUCOSE BLD STRIP.AUTO-MCNC: 110 MG/DL (ref 65–100)
GLUCOSE SERPL-MCNC: 102 MG/DL (ref 65–100)
GLUCOSE SERPL-MCNC: 112 MG/DL (ref 65–100)
GLUCOSE SERPL-MCNC: 151 MG/DL (ref 65–100)
HBA1C MFR BLD: 5.2 % (ref 4.2–6.3)
HCT VFR BLD AUTO: 24 % (ref 35–47)
HGB BLD-MCNC: 8.2 G/DL (ref 11.5–16)
MAGNESIUM SERPL-MCNC: 2.2 MG/DL (ref 1.6–2.4)
MCH RBC QN AUTO: 30.8 PG (ref 26–34)
MCHC RBC AUTO-ENTMCNC: 34.2 G/DL (ref 30–36.5)
MCV RBC AUTO: 90.2 FL (ref 80–99)
PHOSPHATE SERPL-MCNC: 4.4 MG/DL (ref 2.6–4.7)
PLATELET # BLD AUTO: 191 K/UL (ref 150–400)
POTASSIUM SERPL-SCNC: 4.6 MMOL/L (ref 3.5–5.1)
POTASSIUM SERPL-SCNC: 4.6 MMOL/L (ref 3.5–5.1)
POTASSIUM SERPL-SCNC: 4.9 MMOL/L (ref 3.5–5.1)
PROT SERPL-MCNC: 6.2 G/DL (ref 6.4–8.2)
RBC # BLD AUTO: 2.66 M/UL (ref 3.8–5.2)
SERVICE CMNT-IMP: ABNORMAL
SERVICE CMNT-IMP: ABNORMAL
SODIUM SERPL-SCNC: 135 MMOL/L (ref 136–145)
SODIUM SERPL-SCNC: 138 MMOL/L (ref 136–145)
SODIUM SERPL-SCNC: 139 MMOL/L (ref 136–145)
WBC # BLD AUTO: 5.7 K/UL (ref 3.6–11)

## 2017-07-09 PROCEDURE — 82962 GLUCOSE BLOOD TEST: CPT

## 2017-07-09 PROCEDURE — 74011250637 HC RX REV CODE- 250/637: Performed by: INTERNAL MEDICINE

## 2017-07-09 PROCEDURE — 36415 COLL VENOUS BLD VENIPUNCTURE: CPT | Performed by: INTERNAL MEDICINE

## 2017-07-09 PROCEDURE — 97165 OT EVAL LOW COMPLEX 30 MIN: CPT | Performed by: OCCUPATIONAL THERAPIST

## 2017-07-09 PROCEDURE — 83036 HEMOGLOBIN GLYCOSYLATED A1C: CPT | Performed by: INTERNAL MEDICINE

## 2017-07-09 PROCEDURE — 85027 COMPLETE CBC AUTOMATED: CPT | Performed by: INTERNAL MEDICINE

## 2017-07-09 PROCEDURE — 83735 ASSAY OF MAGNESIUM: CPT | Performed by: INTERNAL MEDICINE

## 2017-07-09 PROCEDURE — 80076 HEPATIC FUNCTION PANEL: CPT | Performed by: INTERNAL MEDICINE

## 2017-07-09 PROCEDURE — 80048 BASIC METABOLIC PNL TOTAL CA: CPT | Performed by: INTERNAL MEDICINE

## 2017-07-09 PROCEDURE — 74011250636 HC RX REV CODE- 250/636: Performed by: INTERNAL MEDICINE

## 2017-07-09 PROCEDURE — 84100 ASSAY OF PHOSPHORUS: CPT | Performed by: INTERNAL MEDICINE

## 2017-07-09 RX ORDER — CEPHALEXIN 500 MG/1
500 CAPSULE ORAL 2 TIMES DAILY
Qty: 20 CAP | Refills: 0 | Status: SHIPPED
Start: 2017-07-09 | End: 2017-07-19

## 2017-07-09 RX ORDER — SODIUM POLYSTYRENE SULFONATE 15 G/60ML
30 SUSPENSION ORAL; RECTAL
Status: DISCONTINUED | OUTPATIENT
Start: 2017-07-09 | End: 2017-07-09 | Stop reason: HOSPADM

## 2017-07-09 RX ADMIN — Medication 10 ML: at 05:46

## 2017-07-09 RX ADMIN — HEPARIN SODIUM 5000 UNITS: 5000 INJECTION, SOLUTION INTRAVENOUS; SUBCUTANEOUS at 05:45

## 2017-07-09 RX ADMIN — LEVOTHYROXINE SODIUM 88 MCG: 88 TABLET ORAL at 08:46

## 2017-07-09 RX ADMIN — METOPROLOL TARTRATE 12.5 MG: 25 TABLET ORAL at 08:46

## 2017-07-09 NOTE — PROGRESS NOTES
Bedside and Verbal shift change report given to Gaby Mcduffie RN (oncoming nurse) by David Lyman RN (offgoing nurse). Report included the following information SBAR, Kardex, ED Summary, Intake/Output, Recent Results, Med Rec Status and Cardiac Rhythm NSR with PVC, PAC's, /SB. 2000: Duel Skin Assessment:    Primary Nurse Chase Winkler and Renato Islas RN performed a dual skin assessment on this patient Impairment noted- exzema on left side of left hand, left forearm. Laceration to rt lower leg with bruising and swelling. see wound doc flow sheet  David score is 23    2014: Pt alert, oriented, denies pain. Frequent loose bm's due to Kayexalate given in ER. Patient Vitals for the past 4 hrs:   Temp Pulse Resp BP SpO2   07/08/17 2111 - 75 - 150/65 -   07/08/17 2032 - 60 - 186/67 -   07/08/17 2014 98.1 °F (36.7 °C) 61 16 192/76 95 %        2034: Administered 20 mg Apresoline IV. For elevated bp. Pt tolerated well. 2250: Notified Dr. Rosie Phoenix of pt status. No new orders at this time. 2310: Pt c/o restless leg; pt states, \"I have restless legs when I'm tired\". 2520: Pt alert, oriented, denies pain. Pt c/o flatulence with small amount of loose BM. Patient Vitals for the past 8 hrs:   Temp Pulse Resp BP SpO2   07/09/17 0458 98 °F (36.7 °C) 73 16 162/59 98 %   07/09/17 0109 - 75 - - -   07/09/17 0009 98.6 °F (37 °C) 72 16 127/54 96 %         Bedside and Verbal shift change report given to JUAQUIN Shin (oncoming nurse) by Gaby Mcduffie RN (offgoing nurse). Report included the following information SBAR, Kardex, ED Summary, Intake/Output, Recent Results, Med Rec Status and Cardiac Rhythm NSR with PAC's.

## 2017-07-09 NOTE — PROGRESS NOTES
Occupational Therapy EVALUATION/discharge  Patient: Bette Mahmood (80 y.o. female)  Date: 7/9/2017  Primary Diagnosis: Hyperkalemia        Precautions:   (standard)    ASSESSMENT:   Based on the objective data described below, the patient presents at an overall independent level with ADLs and functional mobility. She demonstrated good safety awareness and no LOB. Further skilled acute occupational therapy is not indicated at this time. Discharge Recommendations: None for OT  Further Equipment Recommendations for Discharge: none for OT      SUBJECTIVE:   Patient stated I slipped getting off a pontoon boat a few days ago and cut my leg.     OBJECTIVE DATA SUMMARY:   HISTORY:   Past Medical History:   Diagnosis Date    CAD (coronary artery disease)     Coagulation defects     anemia    Diabetes ([address]     Thyroid disease      Past Surgical History:   Procedure Laterality Date    CARDIAC SURG PROCEDURE UNLIST      CABBAGE 5/2012    HX COLONOSCOPY  8/7/2012        HX GYN      hysterectomy    HX HEENT      right eye macular repair    HX HYSTERECTOMY  1/1/2004    Dr.Charles Yamilet Owens    HX OTHER SURGICAL      macular eye hole repair 1998 Dr.Wagner Fatou MCCARTY OTHER SURGICAL  5/22/12    open heart surgery     HX OTHER SURGICAL      artery plaque right leg 4/30/14-- left leg 1/12/15        Prior Level of Function/Home Situation: Independent, active, drive  Home Situation  Home Environment: Apartment  # Steps to Enter: 0  One/Two Story Residence: Two story  # of Interior Steps: 10  Interior Rails: Both  Living Alone: Yes  Support Systems: Family member(s), Friends \ neighbors  Patient Expects to be Discharged to[de-identified] Apartment  Current DME Used/Available at Home: None  Tub or Shower Type: Tub/Shower combination  [x]  Right hand dominant   []  Left hand dominant    EXAMINATION OF PERFORMANCE DEFICITS:  Cognitive/Behavioral Status:  Neurologic State: Alert; Appropriate for age  Orientation Level: Oriented X4  Cognition: Appropriate decision making; Appropriate for age attention/concentration; Appropriate safety awareness; Follows commands  Perception: Appears intact  Perseveration: No perseveration noted  Safety/Judgement: Awareness of environment;Driving appropriateness; Fall prevention;Good awareness of safety precautions; Insight into deficits;Home safety    Skin: IMP RLE    Hearing: Auditory  Auditory Impairment: None    Vision/Perceptual:    Acuity: Able to read clock/calendar on wall without difficulty         Range of Motion:  AROM: Within functional limits  PROM: Within functional limits                      Strength:  Strength: Within functional limits                Coordination:  Coordination: Within functional limits  Fine Motor Skills-Upper: Left Intact; Right Intact    Gross Motor Skills-Upper: Left Intact; Right Intact    Tone & Sensation:  Tone: Normal  Sensation: Intact                      Balance:  Sitting: Intact  Standing: Intact    Functional Mobility and Transfers for ADLs:  Bed Mobility:  Rolling: Independent  Supine to Sit: Independent  Sit to Supine: Independent  Scooting: Independent    Transfers:  Sit to Stand: Independent  Stand to Sit: Independent  Bed to Chair: Independent  Toilet Transfer : Independent    ADL Assessment:  Feeding: Independent    Oral Facial Hygiene/Grooming: Independent    Bathing: Independent    Upper Body Dressing: Independent    Lower Body Dressing: Independent    Toileting: Independent     Cognitive Retraining  Safety/Judgement: Awareness of environment;Driving appropriateness; Fall prevention;Good awareness of safety precautions; Insight into deficits;Home safety    Functional Measure:  Barthel Index:    Bathin  Bladder: 10  Bowels: 10  Groomin  Dressing: 10  Feeding: 10  Mobility: 15  Stairs: 10  Toilet Use: 10  Transfer (Bed to Chair and Back): 15  Total: 100       Barthel and G-code impairment scale:  Percentage of impairment CH  0% CI  1-19% CJ  20-39% CK  40-59% CL  60-79% CM  80-99% CN  100%   Barthel Score 0-100 100 99-80 79-60 59-40 20-39 1-19   0   Barthel Score 0-20 20 17-19 13-16 9-12 5-8 1-4 0      The Barthel ADL Index: Guidelines  1. The index should be used as a record of what a patient does, not as a record of what a patient could do. 2. The main aim is to establish degree of independence from any help, physical or verbal, however minor and for whatever reason. 3. The need for supervision renders the patient not independent. 4. A patient's performance should be established using the best available evidence. Asking the patient, friends/relatives and nurses are the usual sources, but direct observation and common sense are also important. However direct testing is not needed. 5. Usually the patient's performance over the preceding 24-48 hours is important, but occasionally longer periods will be relevant. 6. Middle categories imply that the patient supplies over 50 per cent of the effort. 7. Use of aids to be independent is allowed. Naomi Casiano., Barthel, D.W. (0164). Functional evaluation: the Barthel Index. [facility] (14)2. Alverto Sommers lucho MILLIE Tom, Dayanna Costa., Erik Quiroz., [place], [address] [place] [address] (1999). Measuring the change indisability after inpatient rehabilitation; comparison of the responsiveness of the Barthel Index and Functional [place] Measure. Journal of Neurology, Neurosurgery, and Psychiatry, 66(4), 215-476. [address], [place], KRYSTEN Cooper, & Lyssa Cha MXochiltA. (2004.) Assessment of post-stroke quality of life in cost-effectiveness studies: The usefulness of the Barthel Index and the EuroQoL-5D. Quality of Life Research, 13, 311-31       G codes: In compliance with CMSs Claims Based Outcome Reporting, the following G-code set was chosen for this patient based on their primary functional limitation being treated:     The outcome measure chosen to determine the severity of the functional limitation was the Barthel Index with a score of 100/100 which was correlated with the impairment scale. ? Self Care:     - CURRENT STATUS: CH - 0% impaired, limited or restricted    - GOAL STATUS: CH - 0% impaired, limited or restricted    - D/C STATUS:  CH - 0% impaired, limited or restricted       Occupational Therapy Evaluation Charge Determination   History Examination Decision-Making   LOW Complexity : Brief history review  LOW Complexity : 1-3 performance deficits relating to physical, cognitive , or psychosocial skils that result in activity limitations and / or participation restrictions  LOW Complexity : No comorbidities that affect functional and no verbal or physical assistance needed to complete eval tasks       Based on the above components, the patient evaluation is determined to be of the following complexity level: LOW   Pain:Pain Scale 1: Numeric (0 - 10)  Pain Intensity 1: 0              Activity Tolerance:   Good  Please refer to the flowsheet for vital signs taken during this treatment. After treatment:   []  Patient left in no apparent distress sitting up in chair  [x]  Patient left in no apparent distress in bed  [x]  Call bell left within reach  [x]  Nursing notified  []  Caregiver present  []  Bed alarm activated    COMMUNICATION/EDUCATION:   Communication/Collaboration:  [x]      Home safety education was provided and the patient/caregiver indicated understanding. [x]      Patient/family have participated as able and agree with findings and recommendations. []      Patient is unable to participate in plan of care at this time.   Findings and recommendations were discussed with: Registered Nurse    Lashawn Gan OT  Time Calculation: 10 mins

## 2017-07-09 NOTE — PROGRESS NOTES
CM Note:  Reviewed EMR and spoke with nursing. No case managements needs identified for rebecca Hunter

## 2017-07-09 NOTE — PROCEDURES
[facility]  *** FINAL REPORT ***    Name: Carmen Klein  MRN: [id number]  : 31 Dec 1934  HIS Order #: [id number]  [address] [facility] Visit #: [id number]  Date: 2017    TYPE OF TEST: Peripheral Venous Testing    REASON FOR TEST  Limb swelling    Right Leg:-  Deep venous thrombosis:           No  Superficial venous thrombosis:    No  Deep venous insufficiency:        No  Superficial venous insufficiency: No      INTERPRETATION/FINDINGS  PROCEDURE:  RIGHT LOWER EXTREMITY  VENOUS DUPLEX. Evaluation of lower  extremity veins with ultrasound (B-mode imaging, pulsed Doppler, color   Doppler). Includes the common femoral, deep femoral, femoral,  popliteal, posterior tibial, peroneal, and great saphenous veins. Other veins, for example the gastrocnemius and soleal veins, may also  be visualized. FINDINGS: Technically dificult study due to limb swelling and  wound  placement on right lower extremity. Gray scale and color flow duplex  images of the veins in the right lower extremity demonstrate normal  compressibility, spontaneous and augmented flow profiles, and absence  of filling defects throughout the deep and superficial veins in the  right lower extremity. CONCLUSION: Right lower extremity venous duplex negative for deep  venous thrombosis or thrombophlebitis. Left common femoral vein is  thrombus free. ADDITIONAL COMMENTS    I have personally reviewed the data relevant to the interpretation of  this  study. TECHNOLOGIST: Yong Rg RVT  Signed: 2017 11:46:09 AM    PHYSICIAN: Merissa Antonio.  Tino Valdivia MD  Signed: 2017 8:40:21 AM

## 2017-07-09 NOTE — CONSULTS
Gus Ferreira [facility] [address]   [address] [facility], [address]   [address] [facility]       Name:  Aidan Alonso   MR#:  [id number]   :  1934   Account #:  [de-identified]    Date of Consultation:  2017   Date of Adm:  2017       REASON FOR CONSULTATION: Elevated serum creatinine,   hyperkalemia. This is an 41-year-old white female with the following medical   problems:   1. Hypertension. 2. Diabetes mellitus. 3. Status post CABG. 4. History of \"thyroid disease. \"    We are asked to see the patient in regards to an elevated serum   creatinine, today measured at 1.64 mg/dL and hyperkalemia   (potassium 6.4 mEq/L). For reference, the most recent values available   date from 2015 with creatinine 1.19 mg/dL at that time. This is an 41-year-old white female who was seen previously by my   partner, Dr. Nick Canales, several years ago, around the time of acute   kidney injury with coronary revascularization. She has not been actively   followed in our office. As noted, her serum creatinine was a little   elevated approximately a year-and-a-half ago, but she denies any   known history of renal insufficiency. She denies any history of   hematuria, proteinuria, nephrolithiasis or pyelonephritis. She notes   some decrease in force of urinary stream, but otherwise no symptoms   suggestive of urinary outlet obstruction. There is no family history of   kidney disease. She is a former smoker, quit. There is no previous   history of transfusion, hepatitis, or jaundice. No history of significant   nonsteroidal anti-inflammatory ingestion. No history of   ingestion of herbal supplements. She has a history of diabetes mellitus. When questioned in regards to   her history of hypertension, she denies any hypertension, although she   is on antihypertensive therapy, and her history is somewhat unreliable   therefore.     She tells me that she has had diarrhea for several days, after being   started on an antibiotic after experiencing a skin wound. Her oral intake   has also been a bit diminished. MEDICATIONS: Outpatient medications of note include   1. Keflex. 2. Levothyroxine. 3. Zoloft. 4. Metformin 500 mg with dinner. 5. Losartan 25 mg daily. 6. Metoprolol 12.5 mg b.i.d.   7. Enteric-coated aspirin 81 mg daily. FAMILY HISTORY: Negative, as above. SOCIAL HISTORY: Former smoker, quit. REVIEW OF SYSTEMS: Negative, except as noted above. PHYSICAL EXAMINATION   GENERAL: Pleasant, elderly white female, currently on a stretcher. VITAL SIGNS: Blood pressure most recently documented was 170/63,   pulse 65, oxygen saturation 98%. HEAD: Normocephalic, atraumatic. EYES: Show no scleral icterus. NECK: Supple. There is no JVD. LUNGS: Clear to auscultation. CARDIOVASCULAR: Exam shows a rhythm which is regular. ABDOMEN: Soft, nondistended. EXTREMITIES: Show no ankle edema. NEUROLOGIC: She is awake, alert, answers questions appropriately. INTEGUMENT: Shows some bruising and a bandage overlying the   pretibial region of the right lower extremity. LABORATORY OF NOTE: Sodium 135, potassium 6.4, total CO2 of   26, creatinine 1.64, BUN 40, calcium 8.5, phosphorus 4.5. Renal   ultrasound performed today showed normal echogenicity, no   hydronephrosis. Last urinalysis dates from 2014: Negative for protein   and negative for blood. ASSESSMENT AND PLAN: This patient may have chronic kidney   disease; her creatinine was elevated a year-and-a-half ago. She probably has acute kidney injury and has definite hyperkalemia,   and this hyperkalemia is being treated by the attending physician. Diabetic nephropathy, hypertensive nephrosclerosis, physiologic effect   of the angiotensin receptor blocker, all are initial considerations. In   addition, renal artery stenosis or glomerulosclerosis due to the toxic   effects of tobacco are also of concern.     We will check a urinalysis; in addition, a repeat set of labs have been   requested (chemistries) in about 3 hours, to assess the effect of the   medical therapy for her hyperkalemia, and hopefully this coupled with   the intravenous fluids currently inflowing, will result in a kaliuresis and   loss of potassium in her bowel and improvement with development of   normokalemia subsequently. Would continue off the angiotensin receptor blocker at present. I will check her labs tomorrow; if things are stable/improved, we will be   back on Monday. I have told that, in the event that she is discharged,   she should follow up with her primary care physician for labs early in   the week, and we can see her as needed in the office otherwise. Aside from the above, we will check labs tomorrow, and Dr. Karina Haider will   follow up on the patient on Monday, should she remain hospitalized. Thank you for the consultation.         Ray King MD CGA / FS   D:  07/08/2017   17:01   T:  07/08/2017   20:38   Job #:  [id number]

## 2017-07-09 NOTE — DISCHARGE SUMMARY
Physician Discharge Summary     Patient ID:  Sonali Goodwin  [id number]  80 y.o.  1934    Admit date: 7/8/2017    Discharge date and time: 7/9/2017    Admission Diagnoses: Hyperkalemia    Discharge Diagnoses/Hospital Course   79 yo hx of DM, CAD, anemia, presented w/ KATHRINE, hyperkalemia     1) Acute hyperkalemia (K 6.4 on admission) : likely due to recent NSAIDS and KATHRINE and ARB use. Pt was treated for hyperkalemia, ARB was held and IVF's administered with improvement in K+ to 4.9     2) KATHRINE: (creatinine 1.6 on admission) likely due to NSAIDS and ARB use. Improved to 1.3 at time of discharge. Pt advised to home metformin for now. Keflex dose renally adjusted      3) R leg injury/wound/swelling: due to fall from boat. Ecchymotic but no infected. Keflex renally adjusted      4) CAD: on ASA, BB. ?myalgias. Defer to PCP whether to stop statin      5) HTN: on beta blocker. ARB held due to #1 and #2.      6) DM type 2: A1c 5.2. Defer to PCP but perhaps can hold metformin indefinitely due to possibly over tight control for her age     7) Anemia: chronic. Previously followed by Dr. Donis Soto. Did appear to have a component of LORRIE previously as evidenced by markedly low ferritin levels. May need follow-up with heme/onc     8) Myalgia: ?statin. PCP to monitor.  ?need to cease      PCP: Jose Armando Montgomery MD     Consults: Nephrology    Discharge Exam:  Visit Vitals    /43    Pulse 70    Temp 98.4 °F (36.9 °C)    Resp 16    Ht 5' 1\" (1.549 m)    Wt 67.6 kg (149 lb)    SpO2 98%    BMI 28.15 kg/m2     Gen:  Well-developed, well-nourished, in no acute distress  HEENT:  Pink conjunctivae, PERRL, hearing intact to voice, moist mucous membranes  Neck:  Supple, without masses, thyroid non-tender  Resp:  No accessory muscle use, clear breath sounds without wheezes rales or rhonchi  Card:  No murmurs, normal S1, S2 without thrills, bruits or peripheral edema  Abd:  Soft, non-tender, non-distended, normoactive bowel sounds are present  Lymph:  No cervical adenopathy  Musc:  No cyanosis or clubbing  Skin:  Right leg swelling, ecchymosis. Small shin wound. No purulent drainage  Neuro:  Cranial nerves 3-12 are grossly intact, follows commands appropriately  Psych:  Alert with good insight. Oriented to person, place, and time    Disposition: home    Patient Instructions:   Current Discharge Medication List      CONTINUE these medications which have CHANGED    Details   cephALEXin (KEFLEX) 500 mg capsule Take 1 Cap by mouth two (2) times a day for 10 days. Qty: 20 Cap, Refills: 0         CONTINUE these medications which have NOT CHANGED    Details   fluocinoNIDE (LIDEX) 0.05 % ointment Apply  to affected area nightly as needed (itching due to eczema). cholecalciferol, vitamin D3, 2,000 unit tab Take 2,000 Units by mouth daily. cyanocobalamin, vitamin B-12, 2,500 mcg chew Take 2,500 mcg by mouth every Sunday. levothyroxine (SYNTHROID) 88 mcg tablet Take 88 mcg by mouth Daily (before breakfast). sertraline (ZOLOFT) 50 mg tablet Take 50 mg by mouth every evening. VIT A/VIT C/VIT E/ZINC/COPPER (PRESERVISION AREDS PO) Take 1 Tab by mouth two (2) times a day. metoprolol (LOPRESSOR) 25 mg tablet Take 12.5 mg by mouth two (2) times a day. aspirin 81 mg tablet Take 81 mg by mouth every evening.          STOP taking these medications       metFORMIN (GLUCOPHAGE) 500 mg tablet Comments:   Reason for Stopping:         losartan (COZAAR) 25 mg tablet Comments:   Reason for Stopping:             Activity: Activity as tolerated  Diet: Diabetic Diet  Wound Care: None needed    Follow-up with Kayy Sy MD within one week     Approximate time spent in patient care on day of discharge: 34 minutes     Signed:  Viviana Dolan MD  7/9/2017  2:19 PM

## 2017-07-09 NOTE — PROGRESS NOTES
Physical Therapy  Order received and acknowledged. Spoke with nursing prior to attempting to see the patient for PT evaluation/treatment this day. Per nursing patient is being discharged. PT attempted to see patient. She was observed to be standing up in the room with her family coming out of bathroom. PT explained role of PT and why consulted. Patient declined PT services at this time as she is Indep and denies any equipment needs. We will complete the order. Thank you.     Ira Hodges, PT, DPT, CLT

## 2017-07-09 NOTE — DISCHARGE INSTRUCTIONS
HOSPITALIST DISCHARGE INSTRUCTIONS  NAME: Dario Booth   :  1934   MRN:  [id number]     Date/Time:  2017 10:38 AM    ADMIT DATE: 2017     DISCHARGE DATE: 2017     ADMITTING DIAGNOSIS:  Hyperkalemia (K+ 6.4 on admission)   Acute renal failure (Creatinine 1.64 on admission)     DISCHARGE DIAGNOSIS:  As above     MEDICATIONS:  We will have your hold your home losartan (due to your elevated potassium and kidney numbers) and metformin (elevated creatinine)   Please also take 1/2 of your prescribed Keflex dose. For your age and kidney numbers, 500mg BID is appropriate (written as 1000mg BID)   Also please note that while your kidneys are recovering would advise not using NSAIDS like ibuprofen, diclofenac, naproxen, etc     · It is important that you take the medication exactly as they are prescribed. · Keep your medication in the bottles provided by the pharmacist and keep a list of the medication names, dosages, and times to be taken in your wallet. · Do not take other medications without consulting your doctor. Pain Management: per above medications    What to do at Home    Recommended diet:  Diabetic Diet    Recommended activity: Activity as tolerated     If you experience any of the following symptoms then please call your primary care physician or return to the emergency room if you cannot get hold of your doctor:  Fever, chills, nausea, vomiting, diarrhea, change in mentation, falling, bleeding, shortness of breath, chest pain     Follow Up:  Dr. Kemar Prakash MD  you are to call and set up an appointment as soon as possible for basic metabolic panel check     Information obtained by :  I understand that if any problems occur once I am at home I am to contact my physician. I understand and acknowledge receipt of the instructions indicated above. Physician's or R.N.'s Signature                                                                  Date/Time                                                                                                                                              Patient or Representative Signature                                                          Date/Time

## 2017-07-09 NOTE — PROGRESS NOTES
Discharge instruction given. Patient, son and daughter in room. No questions or concerns at this time. Iv taken out. Patient given kayexalate 15g. Dr Rosario Archibald ok with patient taken medication home to take since it will give her diarrhea. Patient being discharged via wheelchair.

## 2017-07-10 LAB
ATRIAL RATE: 55 BPM
CALCULATED P AXIS, ECG09: 72 DEGREES
CALCULATED R AXIS, ECG10: 44 DEGREES
CALCULATED T AXIS, ECG11: 48 DEGREES
DIAGNOSIS, 93000: NORMAL
P-R INTERVAL, ECG05: 162 MS
Q-T INTERVAL, ECG07: 454 MS
QRS DURATION, ECG06: 66 MS
QTC CALCULATION (BEZET), ECG08: 434 MS
VENTRICULAR RATE, ECG03: 55 BPM

## 2019-05-18 ENCOUNTER — APPOINTMENT (OUTPATIENT)
Dept: GENERAL RADIOLOGY | Age: 84
End: 2019-05-18
Attending: STUDENT IN AN ORGANIZED HEALTH CARE EDUCATION/TRAINING PROGRAM
Payer: MEDICARE

## 2019-05-18 ENCOUNTER — HOSPITAL ENCOUNTER (EMERGENCY)
Age: 84
Discharge: HOME | End: 2019-05-18
Attending: STUDENT IN AN ORGANIZED HEALTH CARE EDUCATION/TRAINING PROGRAM
Payer: MEDICARE

## 2019-05-18 VITALS
WEIGHT: 130 LBS | OXYGEN SATURATION: 95 % | SYSTOLIC BLOOD PRESSURE: 184 MMHG | RESPIRATION RATE: 18 BRPM | BODY MASS INDEX: 24.56 KG/M2 | HEART RATE: 62 BPM | TEMPERATURE: 98.1 F | DIASTOLIC BLOOD PRESSURE: 80 MMHG

## 2019-05-18 DIAGNOSIS — R05.9 COUGH: Primary | ICD-10-CM

## 2019-05-18 PROCEDURE — 71046 X-RAY EXAM CHEST 2 VIEWS: CPT

## 2019-05-18 PROCEDURE — 99281 EMR DPT VST MAYX REQ PHY/QHP: CPT

## 2019-05-18 NOTE — ED PROVIDER NOTES
80 y.o. female with past medical history significant for CAD and DM who presents from [facility] with chief complaint of cough. Pt has had a productive cough for 4 weeks. She was seen at [facility] for her cough and was told that her blood pressure was elevated and O2 sats were low. Pt states she was sent here from [facility] to get a CXR. Neighbor notes that the pt was seen by her PCP twice for her cough and did not get a CXR both times. Pt notes she is on HTN medication and takes one medication in the morning and another in the evening. Pt mentions she has only had her morning HTN medication. There are no other acute medical concerns at this time. Social hx: Former Smoker; Occassional use of EtOH  PCP: Evan Altamirano MD    Note written by Babak Brown, as dictated by Chauncy Prader, MD 4:52 PM            The history is provided by the patient and a friend (neighbor). No  was used. Past Medical History:   Diagnosis Date    CAD (coronary artery disease)     Coagulation defects     anemia    Diabetes ([address]     Thyroid disease        Past Surgical History:   Procedure Laterality Date    CARDIAC SURG PROCEDURE UNLIST      CABBAGE 5/2012    HX COLONOSCOPY  8/7/2012        HX GYN      hysterectomy    HX HEENT      right eye macular repair    HX HYSTERECTOMY  1/1/2004    Dr.Charles Jamie Lance    HX OTHER SURGICAL      macular eye hole repair 1998     HX OTHER SURGICAL  5/22/12    open heart surgery     HX OTHER SURGICAL      artery plaque right leg 4/30/14-- left leg 1/12/15          No family history on file.     Social History     Socioeconomic History    Marital status:      Spouse name: Not on file    Number of children: Not on file    Years of education: Not on file    Highest education level: Not on file   Occupational History    Not on file   Social Needs    Financial resource strain: Not on file    Food insecurity: Worry: Not on file     Inability: Not on file    Transportation needs:     Medical: Not on file     Non-medical: Not on file   Tobacco Use    Smoking status: Former Smoker    Smokeless tobacco: Never Used   Substance and Sexual Activity    Alcohol use: No     Comment: occassional wine    Drug use: No    Sexual activity: Never     Birth control/protection: None   Lifestyle    Physical activity:     Days per week: Not on file     Minutes per session: Not on file    Stress: Not on file   Relationships    Social connections:     Talks on phone: Not on file     Gets together: Not on file     Attends [employer] service: Not on file     Active member of club or organization: Not on file     Attends meetings of clubs or organizations: Not on file     Relationship status: Not on file    Intimate partner violence:     Fear of current or ex partner: Not on file     Emotionally abused: Not on file     Physically abused: Not on file     Forced sexual activity: Not on file   Other Topics Concern    Not on file   Social History Narrative    Not on file         ALLERGIES: Chlorhexidine; Fluoxetine; Lipitor [atorvastatin]; and Lisinopril    Review of Systems   Constitutional: Negative for activity change, diaphoresis, fatigue and fever. HENT: Negative for congestion and sore throat. Eyes: Negative for photophobia and visual disturbance. Respiratory: Positive for cough. Negative for chest tightness and shortness of breath. Cardiovascular: Negative for chest pain, palpitations and leg swelling. Gastrointestinal: Negative for abdominal pain, blood in stool, constipation, diarrhea, nausea and vomiting. Genitourinary: Negative for difficulty urinating, dysuria, flank pain, frequency and hematuria. Musculoskeletal: Negative for back pain. Neurological: Negative for dizziness, syncope, numbness and headaches. All other systems reviewed and are negative.       Vitals:    05/18/19 1649   BP: 197/79   Pulse: 67   Resp: 16   Temp: 98.1 °F (36.7 °C)   SpO2: 96%   Weight: 59 kg (130 lb)            Physical Exam   Constitutional: She is oriented to person, place, and time. She appears well-developed and well-nourished. No distress. HENT:   Head: Normocephalic and atraumatic. Nose: Nose normal.   Mouth/Throat: Oropharynx is clear and moist. No oropharyngeal exudate. Eyes: Conjunctivae and EOM are normal. Right eye exhibits no discharge. Left eye exhibits no discharge. No scleral icterus. Neck: Normal range of motion. Neck supple. No JVD present. No tracheal deviation present. No thyromegaly present. Cardiovascular: Normal rate, regular rhythm, normal heart sounds and intact distal pulses. Exam reveals no gallop and no friction rub. No murmur heard. Pulmonary/Chest: Effort normal and breath sounds normal. No stridor. No respiratory distress. She has no wheezes. She has no rales. She exhibits no tenderness. Abdominal: Bowel sounds are normal. She exhibits no distension and no mass. There is no tenderness. There is no rebound. Musculoskeletal: Normal range of motion. She exhibits no edema or tenderness. Lymphadenopathy:     She has no cervical adenopathy. Neurological: She is alert and oriented to person, place, and time. No cranial nerve deficit. Coordination normal.   Skin: Skin is warm and dry. No rash noted. She is not diaphoretic. No erythema. No pallor. Psychiatric: She has a normal mood and affect. Her behavior is normal. Judgment and thought content normal.   Nursing note and vitals reviewed.    Note written by Babak Pitts, as dictated by Radha Menezes MD 4:52 PM      MDM  Number of Diagnoses or Management Options  Cough:      Amount and/or Complexity of Data Reviewed  Tests in the radiology section of CPT®: reviewed and ordered  Independent visualization of images, tracings, or specimens: yes    Risk of Complications, Morbidity, and/or Mortality  Presenting problems: low  Diagnostic procedures: low  Management options: low    Patient Progress  Patient progress: stable         Procedures

## 2019-05-18 NOTE — DISCHARGE INSTRUCTIONS
Patient Education        Cough: Care Instructions  Your Care Instructions    A cough is your body's response to something that bothers your throat or airways. Many things can cause a cough. You might cough because of a cold or the flu, bronchitis, or asthma. Smoking, postnasal drip, allergies, and stomach acid that backs up into your throat also can cause coughs. A cough is a symptom, not a disease. Most coughs stop when the cause, such as a cold, goes away. You can take a few steps at home to cough less and feel better. Follow-up care is a key part of your treatment and safety. Be sure to make and go to all appointments, and call your doctor if you are having problems. It's also a good idea to know your test results and keep a list of the medicines you take. How can you care for yourself at home? · Drink lots of water and other fluids. This helps thin the mucus and soothes a dry or sore throat. Honey or lemon juice in hot water or tea may ease a dry cough. · Take cough medicine as directed by your doctor. · Prop up your head on pillows to help you breathe and ease a dry cough. · Try cough drops to soothe a dry or sore throat. Cough drops don't stop a cough. Medicine-flavored cough drops are no better than candy-flavored drops or hard candy. · Do not smoke. Avoid secondhand smoke. If you need help quitting, talk to your doctor about stop-smoking programs and medicines. These can increase your chances of quitting for good. When should you call for help? Call 911 anytime you think you may need emergency care.  For example, call if:    · You have severe trouble breathing.    Call your doctor now or seek immediate medical care if:    · You cough up blood.     · You have new or worse trouble breathing.     · You have a new or higher fever.     · You have a new rash.    Watch closely for changes in your health, and be sure to contact your doctor if:    · You cough more deeply or more often, especially if you notice more mucus or a change in the color of your mucus.     · You have new symptoms, such as a sore throat, an earache, or sinus pain.     · You do not get better as expected. Where can you learn more? Go to http://danielle-amalia.info/. Enter D279 in the search box to learn more about \"Cough: Care Instructions. \"  Current as of: September 5, 2018  Content Version: 11.9  © 9510-2657 [employer]. Care instructions adapted under license by [employer] (which disclaims liability or warranty for this information). If you have questions about a medical condition or this instruction, always ask your healthcare professional. [address] any warranty or liability for your use of this information.

## 2019-05-18 NOTE — ED NOTES
Patient discharged by MD. Discharge papers signed, dated and timed/e-signature filed. Papers given and questions answered. Home with family.

## 2021-01-14 ENCOUNTER — APPOINTMENT (OUTPATIENT)
Dept: RADIATION THERAPY | Age: 86
End: 2021-01-14

## 2021-01-21 ENCOUNTER — HOSPITAL ENCOUNTER (OUTPATIENT)
Dept: RADIATION THERAPY | Age: 86
Discharge: HOME | End: 2021-01-21

## 2021-01-21 VITALS — BODY MASS INDEX: 22.94 KG/M2 | WEIGHT: 121.5 LBS | HEIGHT: 61 IN

## 2021-01-21 RX ORDER — HYDRALAZINE HYDROCHLORIDE 50 MG/1
50 TABLET, FILM COATED ORAL
COMMUNITY

## 2021-01-26 ENCOUNTER — TRANSCRIBE ORDER (OUTPATIENT)
Dept: SCHEDULING | Age: 86
End: 2021-01-26

## 2021-01-26 DIAGNOSIS — C50.411 MALIGNANT NEOPLASM OF UPPER-OUTER QUADRANT OF RIGHT FEMALE BREAST (HCC): Primary | ICD-10-CM

## 2021-01-28 ENCOUNTER — TRANSCRIBE ORDER (OUTPATIENT)
Dept: SCHEDULING | Age: 86
End: 2021-01-28

## 2021-01-28 DIAGNOSIS — C50.411 MALIGNANT NEOPLASM OF UPPER-OUTER QUADRANT OF RIGHT FEMALE BREAST (HCC): Primary | ICD-10-CM

## 2021-02-05 ENCOUNTER — HOSPITAL ENCOUNTER (OUTPATIENT)
Dept: CT IMAGING | Age: 86
Discharge: HOME | End: 2021-02-05
Attending: RADIOLOGY
Payer: MEDICARE

## 2021-02-05 ENCOUNTER — HOSPITAL ENCOUNTER (OUTPATIENT)
Dept: NUCLEAR MEDICINE | Age: 86
Discharge: HOME | End: 2021-02-05
Attending: RADIOLOGY
Payer: MEDICARE

## 2021-02-05 DIAGNOSIS — C50.411 MALIGNANT NEOPLASM OF UPPER-OUTER QUADRANT OF RIGHT FEMALE BREAST (HCC): ICD-10-CM

## 2021-02-05 LAB — CREAT BLD-MCNC: 2.3 MG/DL (ref 0.6–1.3)

## 2021-02-05 PROCEDURE — 71250 CT THORAX DX C-: CPT

## 2021-02-05 PROCEDURE — 78306 BONE IMAGING WHOLE BODY: CPT

## 2021-02-05 PROCEDURE — 74176 CT ABD & PELVIS W/O CONTRAST: CPT

## 2021-02-05 PROCEDURE — 82565 ASSAY OF CREATININE: CPT

## 2022-03-18 PROBLEM — M79.10 MYALGIA: Status: ACTIVE | Noted: 2017-07-08

## 2022-03-18 PROBLEM — N17.9 AKI (ACUTE KIDNEY INJURY) (HCC): Status: ACTIVE | Noted: 2017-07-08

## 2022-03-19 PROBLEM — E11.9 DM TYPE 2 (DIABETES MELLITUS, TYPE 2) (HCC): Status: ACTIVE | Noted: 2017-07-08

## 2022-03-19 PROBLEM — M79.604 RIGHT LEG PAIN: Status: ACTIVE | Noted: 2017-07-08

## 2022-03-19 PROBLEM — E87.5 HYPERKALEMIA: Status: ACTIVE | Noted: 2017-07-08

## 2022-03-20 PROBLEM — I25.10 CAD (CORONARY ARTERY DISEASE): Status: ACTIVE | Noted: 2017-07-08

## 2023-05-29 RX ORDER — LEVOTHYROXINE SODIUM 88 UG/1
88 TABLET ORAL
COMMUNITY
Start: 2016-08-30

## 2023-05-29 RX ORDER — FLUOCINONIDE 0.5 MG/G
OINTMENT TOPICAL
COMMUNITY

## 2023-05-29 RX ORDER — HYDRALAZINE HYDROCHLORIDE 50 MG/1
50 TABLET, FILM COATED ORAL
COMMUNITY